# Patient Record
Sex: MALE | Race: WHITE | NOT HISPANIC OR LATINO | Employment: FULL TIME | URBAN - NONMETROPOLITAN AREA
[De-identification: names, ages, dates, MRNs, and addresses within clinical notes are randomized per-mention and may not be internally consistent; named-entity substitution may affect disease eponyms.]

---

## 2017-08-17 ENCOUNTER — HOSPITAL ENCOUNTER (EMERGENCY)
Facility: HOSPITAL | Age: 22
Discharge: HOME | End: 2017-08-17
Attending: EMERGENCY MEDICINE | Admitting: EMERGENCY MEDICINE

## 2017-08-17 VITALS
OXYGEN SATURATION: 98 % | RESPIRATION RATE: 16 BRPM | WEIGHT: 200 LBS | SYSTOLIC BLOOD PRESSURE: 152 MMHG | TEMPERATURE: 98.2 F | HEART RATE: 103 BPM | HEIGHT: 72 IN | DIASTOLIC BLOOD PRESSURE: 92 MMHG | BODY MASS INDEX: 27.09 KG/M2

## 2017-08-17 DIAGNOSIS — T63.441A ALLERGIC REACTION TO BEE STING: Primary | ICD-10-CM

## 2017-08-17 PROCEDURE — 96372 THER/PROPH/DIAG INJ SC/IM: CPT

## 2017-08-17 PROCEDURE — 25010000002 DEXAMETHASONE PER 1 MG: Performed by: PHYSICIAN ASSISTANT

## 2017-08-17 PROCEDURE — 63710000001 DIPHENHYDRAMINE PER 50 MG: Performed by: PHYSICIAN ASSISTANT

## 2017-08-17 PROCEDURE — 99283 EMERGENCY DEPT VISIT LOW MDM: CPT

## 2017-08-17 RX ORDER — FAMOTIDINE 20 MG/1
20 TABLET, FILM COATED ORAL ONCE
Status: COMPLETED | OUTPATIENT
Start: 2017-08-17 | End: 2017-08-17

## 2017-08-17 RX ORDER — DEXAMETHASONE SODIUM PHOSPHATE 4 MG/ML
4 INJECTION, SOLUTION INTRA-ARTICULAR; INTRALESIONAL; INTRAMUSCULAR; INTRAVENOUS; SOFT TISSUE ONCE
Status: COMPLETED | OUTPATIENT
Start: 2017-08-17 | End: 2017-08-17

## 2017-08-17 RX ORDER — DIPHENHYDRAMINE HCL 50 MG
50 CAPSULE ORAL ONCE
Status: COMPLETED | OUTPATIENT
Start: 2017-08-17 | End: 2017-08-17

## 2017-08-17 RX ADMIN — DEXAMETHASONE SODIUM PHOSPHATE 4 MG: 4 INJECTION, SOLUTION INTRAMUSCULAR; INTRAVENOUS at 17:04

## 2017-08-17 RX ADMIN — DIPHENHYDRAMINE HCL 50 MG: 50 CAPSULE ORAL at 17:05

## 2017-08-17 RX ADMIN — FAMOTIDINE 20 MG: 20 TABLET, FILM COATED ORAL at 17:05

## 2017-08-17 NOTE — ED PROVIDER NOTES
Subjective   HPI Comments: This is a 22-year-old male patient who presents to the ER status post a yellow jacket sting.  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  30 minutes ago he was stung several times in the face by a yellow jacket and noticed some redness and swelling around the forehead in the right eye.  He reported immediately here.  He did not take any medications to alleviate the symptoms.  He does not have any difficulty breathing or any wheezing.    Patient is a 22 y.o. male presenting with allergic reaction.   Allergic Reaction   Presenting symptoms: itching, rash and swelling    Presenting symptoms: no difficulty breathing, no difficulty swallowing and no wheezing    Severity:  Moderate  Duration:  30 minutes  Prior allergic episodes:  Insect allergies (Yellow jacket)  Context: insect bite/sting    Relieved by:  None tried  Worsened by:  Nothing  Ineffective treatments:  None tried      Review of Systems   Constitutional: Negative.  Negative for fever.   HENT: Negative.  Negative for trouble swallowing.    Respiratory: Negative.  Negative for wheezing.    Cardiovascular: Negative.  Negative for chest pain.   Gastrointestinal: Negative.  Negative for abdominal pain.   Endocrine: Negative.    Genitourinary: Negative.  Negative for dysuria.   Skin: Positive for itching and rash.   Neurological: Negative.    Psychiatric/Behavioral: Negative.    All other systems reviewed and are negative.      No past medical history on file.    No Known Allergies    No past surgical history on file.    No family history on file.    Social History     Social History   • Marital status: Single     Spouse name: N/A   • Number of children: N/A   • Years of education: N/A     Social History Main Topics   • Smoking status: Not on file   • Smokeless tobacco: Not on file   • Alcohol use Not on file   • Drug use: Not on file   • Sexual activity: Not on file     Other Topics Concern   • Not on  file     Social History Narrative           Objective   Physical Exam   Constitutional: He is oriented to person, place, and time. He appears well-developed and well-nourished. No distress.   HENT:   Head: Normocephalic and atraumatic.   Right Ear: External ear normal.   Left Ear: External ear normal.   Nose: Nose normal.   Erythema and edema around the forehead and right eye. Throat clear without erythema or edema. No angioedema.   Eyes: Conjunctivae and EOM are normal. Pupils are equal, round, and reactive to light.   Neck: Normal range of motion. Neck supple. No JVD present. No tracheal deviation present.   Cardiovascular: Normal rate, regular rhythm and normal heart sounds.    No murmur heard.  Pulmonary/Chest: Effort normal and breath sounds normal. No respiratory distress. He has no wheezes.   Abdominal: Soft. Bowel sounds are normal. There is no tenderness.   Musculoskeletal: Normal range of motion. He exhibits no edema or deformity.   Neurological: He is alert and oriented to person, place, and time. No cranial nerve deficit.   Skin: Skin is warm and dry. No rash noted. He is not diaphoretic. No erythema. No pallor.   Psychiatric: He has a normal mood and affect. His behavior is normal. Thought content normal.   Nursing note and vitals reviewed.      Procedures         ED Course  ED Course   Comment By Time   Patient's symptoms much improved. Will d/c home and be instructed to use benadryl for relief of further symptoms. OXANA Melara 08/17 1728                  MDM  Number of Diagnoses or Management Options  Risk of Complications, Morbidity, and/or Mortality  Presenting problems: minimal  Management options: minimal        Final diagnoses:   Allergic reaction to bee sting            OXANA Melara  08/17/17 1737

## 2017-08-17 NOTE — DISCHARGE INSTRUCTIONS
Please use benadryl for further relief of your symptoms. Please return to ER if wheezing, difficulty breathing, or swelling occur.    Hypertension  Hypertension, commonly called high blood pressure, is when the force of blood pumping through your arteries is too strong. Your arteries are the blood vessels that carry blood from your heart throughout your body. A blood pressure reading consists of a higher number over a lower number, such as 110/72. The higher number (systolic) is the pressure inside your arteries when your heart pumps. The lower number (diastolic) is the pressure inside your arteries when your heart relaxes. Ideally you want your blood pressure below 120/80.  Hypertension forces your heart to work harder to pump blood. Your arteries may become narrow or stiff. Having untreated or uncontrolled hypertension can cause heart attack, stroke, kidney disease, and other problems.  RISK FACTORS  Some risk factors for high blood pressure are controllable. Others are not.   Risk factors you cannot control include:   · Race. You may be at higher risk if you are .  · Age. Risk increases with age.  · Gender. Men are at higher risk than women before age 45 years. After age 65, women are at higher risk than men.  Risk factors you can control include:  · Not getting enough exercise or physical activity.  · Being overweight.  · Getting too much fat, sugar, calories, or salt in your diet.  · Drinking too much alcohol.  SIGNS AND SYMPTOMS  Hypertension does not usually cause signs or symptoms. Extremely high blood pressure (hypertensive crisis) may cause headache, anxiety, shortness of breath, and nosebleed.  DIAGNOSIS  To check if you have hypertension, your health care provider will measure your blood pressure while you are seated, with your arm held at the level of your heart. It should be measured at least twice using the same arm. Certain conditions can cause a difference in blood pressure between  your right and left arms. A blood pressure reading that is higher than normal on one occasion does not mean that you need treatment. If it is not clear whether you have high blood pressure, you may be asked to return on a different day to have your blood pressure checked again. Or, you may be asked to monitor your blood pressure at home for 1 or more weeks.  TREATMENT  Treating high blood pressure includes making lifestyle changes and possibly taking medicine. Living a healthy lifestyle can help lower high blood pressure. You may need to change some of your habits.  Lifestyle changes may include:  · Following the DASH diet. This diet is high in fruits, vegetables, and whole grains. It is low in salt, red meat, and added sugars.  · Keep your sodium intake below 2,300 mg per day.  · Getting at least 30-45 minutes of aerobic exercise at least 4 times per week.  · Losing weight if necessary.  · Not smoking.  · Limiting alcoholic beverages.  · Learning ways to reduce stress.  Your health care provider may prescribe medicine if lifestyle changes are not enough to get your blood pressure under control, and if one of the following is true:  · You are 18-59 years of age and your systolic blood pressure is above 140.  · You are 60 years of age or older, and your systolic blood pressure is above 150.  · Your diastolic blood pressure is above 90.  · You have diabetes, and your systolic blood pressure is over 140 or your diastolic blood pressure is over 90.  · You have kidney disease and your blood pressure is above 140/90.  · You have heart disease and your blood pressure is above 140/90.  Your personal target blood pressure may vary depending on your medical conditions, your age, and other factors.  HOME CARE INSTRUCTIONS  · Have your blood pressure rechecked as directed by your health care provider.    · Take medicines only as directed by your health care provider. Follow the directions carefully. Blood pressure medicines  must be taken as prescribed. The medicine does not work as well when you skip doses. Skipping doses also puts you at risk for problems.  · Do not smoke.    · Monitor your blood pressure at home as directed by your health care provider.   SEEK MEDICAL CARE IF:   · You think you are having a reaction to medicines taken.  · You have recurrent headaches or feel dizzy.  · You have swelling in your ankles.  · You have trouble with your vision.  SEEK IMMEDIATE MEDICAL CARE IF:  · You develop a severe headache or confusion.  · You have unusual weakness, numbness, or feel faint.  · You have severe chest or abdominal pain.  · You vomit repeatedly.  · You have trouble breathing.  MAKE SURE YOU:   · Understand these instructions.  · Will watch your condition.  · Will get help right away if you are not doing well or get worse.     This information is not intended to replace advice given to you by your health care provider. Make sure you discuss any questions you have with your health care provider.     Document Released: 12/18/2006 Document Revised: 05/03/2016 Document Reviewed: 10/10/2014  [employer] Interactive Patient Education ©2017 [employer]

## 2021-09-14 ENCOUNTER — OFFICE VISIT (OUTPATIENT)
Dept: FAMILY MEDICINE CLINIC | Facility: CLINIC | Age: 26
End: 2021-09-14

## 2021-09-14 DIAGNOSIS — Z02.89 ENCOUNTER FOR PHYSICAL EXAMINATION RELATED TO EMPLOYMENT: Primary | ICD-10-CM

## 2021-09-14 PROCEDURE — DOTPHY: Performed by: NURSE PRACTITIONER

## 2021-09-14 PROCEDURE — 99173 VISUAL ACUITY SCREEN: CPT | Performed by: NURSE PRACTITIONER

## 2021-09-14 PROCEDURE — 81003 URINALYSIS AUTO W/O SCOPE: CPT | Performed by: NURSE PRACTITIONER

## 2023-11-09 ENCOUNTER — TRANSCRIBE ORDERS (OUTPATIENT)
Dept: ADMINISTRATIVE | Facility: HOSPITAL | Age: 28
End: 2023-11-09
Payer: OTHER MISCELLANEOUS

## 2023-11-09 DIAGNOSIS — R11.12 PROJECTILE VOMITING, UNSPECIFIED WHETHER NAUSEA PRESENT: Primary | ICD-10-CM
